# Patient Record
Sex: FEMALE | URBAN - METROPOLITAN AREA
[De-identification: names, ages, dates, MRNs, and addresses within clinical notes are randomized per-mention and may not be internally consistent; named-entity substitution may affect disease eponyms.]

---

## 2021-11-04 ENCOUNTER — OFFICE VISIT (OUTPATIENT)
Dept: URBAN - METROPOLITAN AREA CLINIC 64 | Facility: CLINIC | Age: 46
End: 2021-11-04
Payer: COMMERCIAL

## 2021-11-04 DIAGNOSIS — H52.4 PRESBYOPIA: ICD-10-CM

## 2021-11-04 DIAGNOSIS — H52.13 MYOPIA, BILATERAL: Primary | ICD-10-CM

## 2021-11-04 PROCEDURE — 92004 COMPRE OPH EXAM NEW PT 1/>: CPT | Performed by: OPTOMETRIST

## 2021-11-04 ASSESSMENT — KERATOMETRY
OS: 45.07
OD: 45.03

## 2021-11-04 ASSESSMENT — VISUAL ACUITY
OS: 20/20
OD: 20/20

## 2021-11-04 ASSESSMENT — INTRAOCULAR PRESSURE
OS: 15
OD: 15

## 2022-11-08 ENCOUNTER — OFFICE VISIT (OUTPATIENT)
Dept: URBAN - METROPOLITAN AREA CLINIC 64 | Facility: CLINIC | Age: 47
End: 2022-11-08
Payer: COMMERCIAL

## 2022-11-08 DIAGNOSIS — H52.13 MYOPIA, BILATERAL: Primary | ICD-10-CM

## 2022-11-08 DIAGNOSIS — R51.9 HEADACHE: ICD-10-CM

## 2022-11-08 DIAGNOSIS — H52.4 PRESBYOPIA: ICD-10-CM

## 2022-11-08 PROCEDURE — 92014 COMPRE OPH EXAM EST PT 1/>: CPT | Performed by: OPTOMETRIST

## 2022-11-08 ASSESSMENT — VISUAL ACUITY
OD: 20/20
OS: 20/20

## 2022-11-08 ASSESSMENT — KERATOMETRY
OD: 45.16
OS: 45.04

## 2022-11-08 ASSESSMENT — INTRAOCULAR PRESSURE
OS: 10
OD: 13

## 2022-11-08 NOTE — IMPRESSION/PLAN
Impression: Headache: R51.9. Plan: Discussed with patient not caused by eyes. Likely caused by allergies.

## 2023-11-08 ENCOUNTER — OFFICE VISIT (OUTPATIENT)
Dept: URBAN - METROPOLITAN AREA CLINIC 64 | Facility: LOCATION | Age: 48
End: 2023-11-08
Payer: COMMERCIAL

## 2023-11-08 DIAGNOSIS — H52.13 MYOPIA, BILATERAL: Primary | ICD-10-CM

## 2023-11-08 DIAGNOSIS — H52.4 PRESBYOPIA: ICD-10-CM

## 2023-11-08 PROCEDURE — 92014 COMPRE OPH EXAM EST PT 1/>: CPT | Performed by: OPTOMETRIST

## 2023-11-08 ASSESSMENT — INTRAOCULAR PRESSURE
OS: 15
OD: 18

## 2023-11-08 ASSESSMENT — KERATOMETRY
OS: 44.95
OD: 44.98

## 2023-11-08 ASSESSMENT — VISUAL ACUITY
OS: 20/20
OD: 20/20